# Patient Record
Sex: MALE | Race: WHITE | Employment: FULL TIME | URBAN - NONMETROPOLITAN AREA
[De-identification: names, ages, dates, MRNs, and addresses within clinical notes are randomized per-mention and may not be internally consistent; named-entity substitution may affect disease eponyms.]

---

## 2025-04-19 ENCOUNTER — HOSPITAL ENCOUNTER (EMERGENCY)
Age: 55
Discharge: HOME | End: 2025-04-19
Attending: FAMILY MEDICINE
Payer: COMMERCIAL

## 2025-04-19 ENCOUNTER — APPOINTMENT (OUTPATIENT)
Dept: MRI IMAGING | Age: 55
End: 2025-04-19
Payer: COMMERCIAL

## 2025-04-19 ENCOUNTER — APPOINTMENT (OUTPATIENT)
Dept: CT IMAGING | Age: 55
End: 2025-04-19
Payer: COMMERCIAL

## 2025-04-19 VITALS
OXYGEN SATURATION: 97 % | HEART RATE: 74 BPM | RESPIRATION RATE: 20 BRPM | DIASTOLIC BLOOD PRESSURE: 111 MMHG | SYSTOLIC BLOOD PRESSURE: 141 MMHG | WEIGHT: 197 LBS | TEMPERATURE: 98.5 F

## 2025-04-19 DIAGNOSIS — D32.0 MENINGIOMA, CEREBRAL (HCC): Primary | ICD-10-CM

## 2025-04-19 DIAGNOSIS — S09.90XA CLOSED HEAD INJURY, INITIAL ENCOUNTER: ICD-10-CM

## 2025-04-19 DIAGNOSIS — S01.01XA LACERATION OF SCALP, INITIAL ENCOUNTER: ICD-10-CM

## 2025-04-19 LAB
EKG ATRIAL RATE: 65 BPM
EKG P AXIS: 56 DEGREES
EKG P-R INTERVAL: 136 MS
EKG Q-T INTERVAL: 418 MS
EKG QRS DURATION: 102 MS
EKG QTC CALCULATION (BAZETT): 434 MS
EKG R AXIS: 19 DEGREES
EKG T AXIS: 39 DEGREES
EKG VENTRICULAR RATE: 65 BPM

## 2025-04-19 PROCEDURE — A9579 GAD-BASE MR CONTRAST NOS,1ML: HCPCS | Performed by: FAMILY MEDICINE

## 2025-04-19 PROCEDURE — 70553 MRI BRAIN STEM W/O & W/DYE: CPT

## 2025-04-19 PROCEDURE — 93005 ELECTROCARDIOGRAM TRACING: CPT | Performed by: FAMILY MEDICINE

## 2025-04-19 PROCEDURE — 70450 CT HEAD/BRAIN W/O DYE: CPT

## 2025-04-19 PROCEDURE — 99285 EMERGENCY DEPT VISIT HI MDM: CPT

## 2025-04-19 PROCEDURE — 6360000004 HC RX CONTRAST MEDICATION: Performed by: FAMILY MEDICINE

## 2025-04-19 PROCEDURE — 96374 THER/PROPH/DIAG INJ IV PUSH: CPT

## 2025-04-19 RX ADMIN — GADOTERIDOL 15 ML: 279.3 INJECTION, SOLUTION INTRAVENOUS at 22:24

## 2025-04-20 NOTE — ED PROVIDER NOTES
EMERGENCY DEPARTMENT ENCOUNTER    CHIEF COMPLAINT   Chief Complaint   Patient presents with    Head Injury        HPI   Guero Jefferson is a 54 y.o. 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 Onset was 11 am today. The context is that the patient's injury occurred by accident while at work. There was some bleeding. He went to [facility], where he got CT imaging and labs. CT head was concerning for either skull fracture or ICH (falx). Pt received a tetanus update while he was there. The duration of the pain has been constant since the onset. There are no alleviating factors. The pain worsens with movement. Associated symptoms: no LOC.       Historian was the patient himself and family members.     REVIEW OF SYSTEMS   General: No fever   Respiratory: No difficulty breathing, No apnea   GI: No vomiting and no bloody stools   : No bloody urine   Neurologic: transient confusion after injury.   See HPI for further details.   All other systems reviewed and are negative.     PAST MEDICAL AND FAMILY HISTORY   History reviewed. No pertinent past medical history.   History reviewed. No pertinent surgical history.     SOCIAL & FAMILY HISTORY   Social History     Socioeconomic History    Marital status:      Spouse name: None    Number of children: None    Years of education: None    Highest education level: None      History reviewed. No pertinent family history.     CURRENT MEDICATIONS        ALLERGIES   No Known Allergies     IMMUNIZATIONS     There is no immunization history on file for this patient.     PHYSICAL EXAM   VITAL SIGNS: BP (!) 141/111   Pulse 74   Temp 98.5 °F (36.9 °C)   Resp 20   Wt 89.4 kg (197 lb)   SpO2 97%    Constitutional: Well developed, Well nourished   HENT: avulsion of skin on dome of head, bleeding controlled, negative Stanford sign   Ears: External ears without lacerations or abrasions, no hemotympanum

## 2025-04-20 NOTE — DISCHARGE INSTRUCTIONS
WE CONFIRMED THAT THE SPOT IN YOUR BRAIN WAS NOT BLEEDING BUT A MENINGIOMA.    THE RADIOLOGIST RECOMMEND YOU FOLLOW UP WITH ANOTHER CAT SCAN IN 6 MONTHS TIME.

## 2025-05-21 ENCOUNTER — HOSPITAL ENCOUNTER (OUTPATIENT)
Dept: CT IMAGING | Age: 55
Discharge: HOME | End: 2025-05-21
Attending: RADIOLOGY

## 2025-05-21 DIAGNOSIS — Z00.6 ENCOUNTER FOR EXAMINATION FOR NORMAL COMPARISON OR CONTROL IN CLINICAL RESEARCH PROGRAM: ICD-10-CM
